# Patient Record
Sex: MALE | Race: WHITE | NOT HISPANIC OR LATINO | URBAN - METROPOLITAN AREA
[De-identification: names, ages, dates, MRNs, and addresses within clinical notes are randomized per-mention and may not be internally consistent; named-entity substitution may affect disease eponyms.]

---

## 2018-10-30 ENCOUNTER — OFFICE VISIT CONVERTED (OUTPATIENT)
Dept: UROLOGY | Facility: CLINIC | Age: 38
End: 2018-10-30
Attending: NURSE PRACTITIONER

## 2018-10-30 ENCOUNTER — CONVERSION ENCOUNTER (OUTPATIENT)
Dept: UROLOGY | Facility: CLINIC | Age: 38
End: 2018-10-30

## 2018-11-20 ENCOUNTER — OFFICE VISIT CONVERTED (OUTPATIENT)
Dept: UROLOGY | Facility: CLINIC | Age: 38
End: 2018-11-20
Attending: NURSE PRACTITIONER

## 2020-01-17 ENCOUNTER — CONVERSION ENCOUNTER (OUTPATIENT)
Dept: UROLOGY | Facility: CLINIC | Age: 40
End: 2020-01-17

## 2020-01-17 ENCOUNTER — OFFICE VISIT CONVERTED (OUTPATIENT)
Dept: UROLOGY | Facility: CLINIC | Age: 40
End: 2020-01-17
Attending: UROLOGY

## 2020-01-22 ENCOUNTER — HOSPITAL ENCOUNTER (OUTPATIENT)
Dept: GENERAL RADIOLOGY | Facility: HOSPITAL | Age: 40
Discharge: HOME | End: 2020-01-22
Attending: UROLOGY

## 2021-04-06 ENCOUNTER — CONVERSION ENCOUNTER (OUTPATIENT)
Dept: UROLOGY | Facility: CLINIC | Age: 41
End: 2021-04-06

## 2021-04-06 ENCOUNTER — OFFICE VISIT CONVERTED (OUTPATIENT)
Dept: UROLOGY | Facility: CLINIC | Age: 41
End: 2021-04-06
Attending: UROLOGY

## 2021-04-06 LAB
BILIRUB UR QL STRIP: NEGATIVE
COLOR UR: YELLOW
CONV BACTERIA IN URINE MICRO: 0
CONV CALCIUM OXALATE CRYSTALS /HPF IN URINE SEDIMENT BY MICROSCOPY: NORMAL
CONV CLARITY OF URINE: CLEAR
CONV PROTEIN IN URINE BY AUTOMATED TEST STRIP: NEGATIVE
CONV UROBILINOGEN IN URINE BY AUTOMATED TEST STRIP: NORMAL
GLUCOSE UR QL: NEGATIVE
HGB UR QL STRIP: NORMAL
KETONES UR QL STRIP: NEGATIVE
LEUKOCYTE ESTERASE UR QL STRIP: NEGATIVE
NITRITE UR QL STRIP: NEGATIVE
PH UR STRIP.AUTO: 6.5 [PH]
RBC #/AREA URNS HPF: 0 /[HPF]
RENAL EPI CELLS #/AREA URNS HPF: 0 /[HPF]
SP GR UR: 1.02
SQUAMOUS SPT QL MICRO: 0
WBC #/AREA URNS HPF: 0 /[HPF]

## 2021-04-21 ENCOUNTER — HOSPITAL ENCOUNTER (OUTPATIENT)
Dept: GENERAL RADIOLOGY | Facility: HOSPITAL | Age: 41
Discharge: HOME | End: 2021-04-21
Attending: UROLOGY

## 2021-05-14 VITALS
BODY MASS INDEX: 32.08 KG/M2 | HEIGHT: 74 IN | DIASTOLIC BLOOD PRESSURE: 82 MMHG | TEMPERATURE: 97.8 F | SYSTOLIC BLOOD PRESSURE: 138 MMHG | HEART RATE: 100 BPM | WEIGHT: 250 LBS

## 2021-05-14 NOTE — PROGRESS NOTES
Progress Note      Patient Name: Venkatesh Shepard   Patient ID: [id number]   Sex: Male   YOB: 1980    Primary Care Provider: Juni RAMOS    Visit Date: April 6, 2021    Provider: Huy Arroyo MD   Location: [facility]   Location Address: [address] [place] [address], [address]  [place], [place]  [id number]   Location Phone: [phone]          Chief Complaint  · Testicle microlithiasis      History Of Present Illness  The patient is a 40 year old /White male , who presents for a 1 year followup for testicle microlithiasis and history of urethral pain. Last testicular ultrasound 1/17/2020 indicated bilateral small hydroceles. left epididymal cyst and bilateral puntate calcifications testes. Patient is having post void dribbling which keeps his penis irritated. He has no pain in the testicles. His stream is good. Gets dysuria i once in a while. No gross hematuria.       Past Medical History  Cataracts, bilateral; Desires sterilization; Epididymal cyst; Urethral pain         Past Surgical History  Cataract surgery; Finger Surgery; Knee arthroscopy, diagnostic; Vasectomy; Carver Tooth Extraction         Medication List  ibuprofen oral         Allergy List  NO KNOWN DRUG ALLERGIES         Family Medical History  Heart Disease; Cancer, Unspecified; Diabetes         Social History  Alcohol (Current some day); Tobacco (Never)         Review of Systems  · Constitutional  o Denies  o : fever, headache, chills  · Eyes  o Denies  o : eye pain, double vision, blurred vision  · HENT  o Denies  o : sinus problems, sore throat, ear infection  · Cardiovascular  o Denies  o : chest pain, high blood pressure, varicosities  · Respiratory  o Denies  o : shortness of breath, wheezing, frequent cough  · Gastrointestinal  o Denies  o : nausea, vomiting, heartburn, indigestion, abdominal pain  · Genitourinary  o Denies  o : urgency, frequency, urinary retention, painful urination  · Integument  o Denies  o :  "rash, itching, boils  · Neurologic  o Denies  o : tingling or numbness, tremors, dizzy spells  · Musculoskeletal  o Denies  o : joint pain, neck pain, back pain  · Endocrine  o Denies  o : cold intolerance, heat intolerance, tired, excessive thirst, sluggish  · Psychiatric  o Admits  o : feels satisfied with life  o Denies  o : severe depression, concerns with hurting themselves  · Heme-Lymph  o Denies  o : swollen glands, blood clotting problems  · Allergic-Immunologic  o Denies  o : sinus allergy symptoms, hay fever      Vitals  Date Time BP Position Site L\R Cuff Size HR RR TEMP (F) WT  HT  BMI kg/m2 BSA m2 O2 Sat FR L/min FiO2 HC       04/06/2021 03:26 /82 Sitting    100 - R  97.8 250lbs 0oz 6'  2\" 32.1 2.43             Physical Examination  · Constitutional  o Appearance  o : Well nourished, well developed patient in no acute distress. Ambulating without difficulty.  · Gastrointestinal  o Abdominal Examination  o : Scaphoid abdomen which is non-tender to palpation with normal tone and without rigidity or guarding. Normal bowel sounds. No masses present.  o Liver and spleen  o : No hepatomegaly present. Liver is non-tender to palpation and spleen is not palpable.  o Hernias  o : No abdominal wall hernias are present.  · Genitourinary  o Bladder  o : no abnormalities  o Penis  o : normal general appearance, no lesions present, no discharge  o Urethral Meatus  o : no inflammation present. Meatus is narrow  o Scrotum and Scrotal Contents  o :   § Scrotum  § : no abnormalities  § Epididymides  § : epididymides nontender bilaterally. Small left spermatocele in the upper pole  § Testes  § : no abnormalities  · Lymphatic  o Neck  o : No lymphadenopathy present  o Groin  o : No lymphadenopathy present  · Skin and Subcutaneous Tissue  o General Inspection  o : No rashes, lesions or areas of discoloration present. Skin turgor is normal.  o General Palpation  o : No abnormalities, masses or tenderness on " palpation.  · Neurologic  o Mental Status Examination  o : grossly oriented to person, place and time  o Gait and Station  o : normal gait, able to stand without difficulty  · Psychiatric  o Mood and Affect  o : mood normal, affect appropriate          Results  · In-Office Procedures  o Lab procedure  § Automated dipstick urinalysis with microscopy (66518)   § Color Ur: Yellow   § Clarity Ur: Clear   § Glucose Ur Ql Strip: Negative   § Bilirub Ur Ql Strip: Negative   § Ketones Ur Ql Strip: Negative   § Sp Gr Ur Qn: 1.025   § Hgb Ur Ql Strip: Small   § pH Ur-LsCnc: 6.5   § Prot Ur Ql Strip: Negative   § Urobilinogen Ur Strip-mCnc: 1.0 E.U./dL   § Nitrite Ur Ql Strip: Negative   § WBC Est Ur Ql Strip: Negative   § RBC UrnS Qn HPF: 0   § WBC UrnS Qn HPF: 0   § Bacteria UrnS Qn HPF: 0   § Crystals UrnS Qn HPF: few amorphous crystals   § Epithelial Cells (non renal): 0 /HPF  § Epithelial Cells (renal): 0       Assessment  · Hematuria, microscopic     599.72/R31.2  · Epididymal cyst     608.89/N50.3  · Testicular microlithiasis     608.89/N50.89      Plan  · Orders  o Ultrasound, scrotum and contents (31358) - 608.89/N50.89, 599.72/R31.2, 608.89/N50.3 - 04/06/2021  · Medications  o Medications have been Reconciled  o Transition of Care or Provider Policy  · Instructions  o His main problem is post void dribbling and I told him it is probably because of weakness of sphincter. There is an option of meatotomy but it will spray his urinary stream. This cannot continue the way he is at this time.            Electronically Signed by: Huy Arroyo MD -Author on April 6, 2021 04:22:13 PM

## 2021-05-15 VITALS
TEMPERATURE: 98 F | HEIGHT: 74 IN | BODY MASS INDEX: 32.21 KG/M2 | DIASTOLIC BLOOD PRESSURE: 70 MMHG | SYSTOLIC BLOOD PRESSURE: 125 MMHG | WEIGHT: 251 LBS | HEART RATE: 79 BPM

## 2021-05-16 VITALS
TEMPERATURE: 97.7 F | SYSTOLIC BLOOD PRESSURE: 129 MMHG | DIASTOLIC BLOOD PRESSURE: 79 MMHG | BODY MASS INDEX: 32.21 KG/M2 | WEIGHT: 251 LBS | HEART RATE: 87 BPM | HEIGHT: 74 IN

## 2021-05-16 VITALS
HEIGHT: 74 IN | WEIGHT: 249 LBS | SYSTOLIC BLOOD PRESSURE: 139 MMHG | DIASTOLIC BLOOD PRESSURE: 72 MMHG | HEART RATE: 101 BPM | TEMPERATURE: 98.6 F | BODY MASS INDEX: 31.95 KG/M2